# Patient Record
Sex: MALE | Race: OTHER | URBAN - METROPOLITAN AREA
[De-identification: names, ages, dates, MRNs, and addresses within clinical notes are randomized per-mention and may not be internally consistent; named-entity substitution may affect disease eponyms.]

---

## 2021-10-26 ENCOUNTER — EMERGENCY (EMERGENCY)
Facility: HOSPITAL | Age: 41
LOS: 1 days | Discharge: HOME | End: 2021-10-26
Attending: EMERGENCY MEDICINE | Admitting: EMERGENCY MEDICINE
Payer: OTHER MISCELLANEOUS

## 2021-10-26 VITALS
DIASTOLIC BLOOD PRESSURE: 88 MMHG | TEMPERATURE: 98 F | RESPIRATION RATE: 18 BRPM | SYSTOLIC BLOOD PRESSURE: 157 MMHG | OXYGEN SATURATION: 98 % | WEIGHT: 169.98 LBS | HEART RATE: 68 BPM | HEIGHT: 65 IN

## 2021-10-26 VITALS
TEMPERATURE: 99 F | SYSTOLIC BLOOD PRESSURE: 134 MMHG | HEART RATE: 67 BPM | DIASTOLIC BLOOD PRESSURE: 88 MMHG | OXYGEN SATURATION: 98 % | RESPIRATION RATE: 16 BRPM
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
## 2021-10-26 DIAGNOSIS — M79.642 PAIN IN LEFT HAND: ICD-10-CM

## 2021-10-26 DIAGNOSIS — M25.532 PAIN IN LEFT WRIST: ICD-10-CM

## 2021-10-26 DIAGNOSIS — T14.8XXA OTHER INJURY OF UNSPECIFIED BODY REGION, INITIAL ENCOUNTER: ICD-10-CM

## 2021-10-26 DIAGNOSIS — M79.661 PAIN IN RIGHT LOWER LEG: ICD-10-CM

## 2021-10-26 DIAGNOSIS — M79.645 PAIN IN LEFT FINGER(S): ICD-10-CM
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
## 2021-10-26 PROCEDURE — 73552 X-RAY EXAM OF FEMUR 2/>: CPT | Mod: 26,RT

## 2021-10-26 PROCEDURE — 72128 CT CHEST SPINE W/O DYE: CPT | Mod: 26

## 2021-10-26 PROCEDURE — 73130 X-RAY EXAM OF HAND: CPT | Mod: 26,LT

## 2021-10-26 PROCEDURE — 73630 X-RAY EXAM OF FOOT: CPT | Mod: 26,RT

## 2021-10-26 PROCEDURE — 99285 EMERGENCY DEPT VISIT HI MDM: CPT

## 2021-10-26 RX ORDER — METHOCARBAMOL 500 MG/1
2 TABLET, FILM COATED ORAL
Qty: 30 | Refills: 0
Start: 2021-10-26 | End: 2021-10-30

## 2021-10-26 RX ORDER — METHOCARBAMOL 500 MG/1
1500 TABLET, FILM COATED ORAL ONCE
Refills: 0 | Status: COMPLETED | OUTPATIENT
Start: 2021-10-26 | End: 2021-10-26

## 2021-10-26 RX ORDER — KETOROLAC TROMETHAMINE 30 MG/ML
15 SYRINGE (ML) INJECTION ONCE
Refills: 0 | Status: DISCONTINUED | OUTPATIENT
Start: 2021-10-26 | End: 2021-10-26

## 2021-10-26 RX ORDER — IBUPROFEN 200 MG
1.25 TABLET ORAL
Qty: 70 | Refills: 0
Start: 2021-10-26 | End: 2021-11-08

## 2021-10-26 RX ORDER — CYCLOBENZAPRINE HYDROCHLORIDE 10 MG/1
10 TABLET, FILM COATED ORAL ONCE
Refills: 0 | Status: COMPLETED | OUTPATIENT
Start: 2021-10-26 | End: 2021-10-26

## 2021-10-26 RX ORDER — LIDOCAINE 4 G/100G
1 CREAM TOPICAL ONCE
Refills: 0 | Status: COMPLETED | OUTPATIENT
Start: 2021-10-26 | End: 2021-10-26

## 2021-10-26 RX ORDER — ACETAMINOPHEN 500 MG
650 TABLET ORAL ONCE
Refills: 0 | Status: COMPLETED | OUTPATIENT
Start: 2021-10-26 | End: 2021-10-26

## 2021-10-26 RX ADMIN — Medication 15 MILLIGRAM(S): at 10:58

## 2021-10-26 RX ADMIN — Medication 650 MILLIGRAM(S): at 10:57

## 2021-10-26 RX ADMIN — LIDOCAINE 1 PATCH: 4 CREAM TOPICAL at 12:54

## 2021-10-26 RX ADMIN — Medication 15 MILLIGRAM(S): at 11:30

## 2021-10-26 RX ADMIN — Medication 650 MILLIGRAM(S): at 11:50

## 2021-10-26 RX ADMIN — CYCLOBENZAPRINE HYDROCHLORIDE 10 MILLIGRAM(S): 10 TABLET, FILM COATED ORAL at 12:54

## 2021-10-26 RX ADMIN — METHOCARBAMOL 1500 MILLIGRAM(S): 500 TABLET, FILM COATED ORAL at 10:58

## 2021-10-26 NOTE — ED ADULT TRIAGE NOTE - CHIEF COMPLAINT QUOTE
PT BIBEMS after falling off ladder (10-12ft) complaining of R thigh and left wrist pain. . PT denies hitting head, no neck pain, no back pain. PT arrives in C-collar. Pt was unable to walk on scene.

## 2021-10-26 NOTE — ED PROVIDER NOTE - CONSTITUTIONAL, MLM
normal... C-collar in place. Awake, alert, oriented to person, place, time/situation and in no apparent distress.

## 2021-10-26 NOTE — ED PROVIDER NOTE - NSFOLLOWUPINSTRUCTIONS_ED_ALL_ED_FT
MUSCLE STRAIN - AfterCare(R) Instructions(ER/ED)           Muscle Strain    WHAT YOU NEED TO KNOW:    A muscle strain is a twist, pull, or tear of a muscle or tendon. A tendon is a strong elastic tissue that connects a muscle to a bone. Signs of a strained muscle include bruising and swelling over the area, pain with movement, and loss of strength.    DISCHARGE INSTRUCTIONS:    Return to the emergency department if:   •You suddenly cannot feel or move your injured muscle.          Contact your healthcare provider if:   •Your pain and swelling worsen or do not go away.       •You have questions or concerns about your condition or care.      Medicines:   •NSAIDs help decrease swelling and pain or fever. This medicine is available with or without a doctor's order. NSAIDs can cause stomach bleeding or kidney problems in certain people. If you take blood thinner medicine, always ask your healthcare provider if NSAIDs are safe for you. Always read the medicine label and follow directions.      •Muscle relaxers help decrease pain and muscle spasms.      •Take your medicine as directed. Contact your healthcare provider if you think your medicine is not helping or if you have side effects. Tell him of her if you are allergic to any medicine. Keep a list of the medicines, vitamins, and herbs you take. Include the amounts, and when and why you take them. Bring the list or the pill bottles to follow-up visits. Carry your medicine list with you in case of an emergency.      Follow up with your healthcare provider as directed: Your healthcare provider may suggest that you have a follow-up visit before you go back to your usual activity. Write down your questions so you remember to ask them during your visits.    Self-care:   •3 to 7 days after the injury: Use Rest, Ice, Compression, and Elevation (RICE) to help stop bruising and decrease pain and swelling.?Rest: Rest your muscle to allow your injury to heal. When the pain decreases, begin normal, slow movements. For mild and moderate muscle strains, you should rest your muscles for about 2 days. However, if you have a severe muscle strain, you should rest for 10 to 14 days. You may need to use crutches to walk if your muscle strain is in your legs or lower body.       ?Ice: Put an ice pack on the injured area. Put a towel between the ice pack and your skin. Do not put the ice pack directly on your skin. You can use a package of frozen peas instead of an ice pack.      ?Compression: You may need to wrap an elastic bandage around the area to decrease swelling. It should be tight enough for you to feel support. Do not wrap it too tightly.       ?Elevation: Keep the injured muscle raised above your heart if possible. For example if you have a strain of your lower leg muscle, lie down and prop your leg up on pillows. This helps decrease pain and swelling.      •3 to 21 days after the injury: Start to slowly and regularly exercise your muscle. This will help it heal. If you feel pain, decrease how hard you are exercising.       •1 to 6 weeks after the injury: Stretch the injured muscle. Hold the stretch for about 30 seconds. Do this 4 times a day. You may stretch the muscle until you feel a slight pull. Stop stretching if you feel pain.       •2 weeks to 6 months after the injury: The goal of this phase is to return to the activity you were doing before the injury happened, without hurting the muscle again.       •3 weeks to 6 months after the injury: Keep stretching and strengthening your muscles to avoid injury. Slowly increase the time and distance that you exercise. You may have signs and symptoms of muscle strain 6 months after the injury, even if you do things to help it heal. In this case, you may need surgery on the muscle.          © Copyright [employer] 2021           back to top                          © Copyright [employer] 2021

## 2021-10-26 NOTE — ED PROVIDER NOTE - ENMT, MLM
Airway patent. Airway patent. no cervical spine tenderness, no step offs, no deformities. Head NCAT. FROM of neck/cervical spine without issue

## 2021-10-26 NOTE — ED PROVIDER NOTE - CLINICAL SUMMARY MEDICAL DECISION MAKING FREE TEXT BOX
Exam is remarkable for mild pain to the dorsum of the R foot at the forefoot and underneath the foot. There is tenderness and active spasms to the R thigh and some Tenderness to palpation over the L hand. Pt has point versus paraspinal tenderness to the upper thoracic spine. Plan for pain control, XR imaging [L hand. R foot], and CT imaging of the T-spine. Will reassess clinically after results have been obtained. 42 y/o M presenting with L hand/wrist pain and RLE pain. Exam is remarkable for mild pain to the dorsum of the R foot at the forefoot and underneath the foot. There is tenderness and active spasms to the R thigh and some Tenderness to palpation over the L hand. Pt has point versus paraspinal tenderness to the upper thoracic spine. Plan for pain control, XR imaging [L hand. R foot], and CT imaging of the T-spine. Will reassess clinically after results have been obtained.

## 2021-10-26 NOTE — ED PROVIDER NOTE - PATIENT PORTAL LINK FT
You can access the FollowMyHealth Patient Portal offered by [facility] by registering at the following website: [email or web address]. By joining [employer]’s FollowMyHealth portal, you will also be able to view your health information using other applications (apps) compatible with our system.

## 2021-10-26 NOTE — ED PROVIDER NOTE - PROGRESS NOTE DETAILS
XR imaging is [-] for Fx or dislocation. CT imaging is unremarkable. Will treat for musculoskeletal pain. Pt was able to walk around the ED without assistance but notes some R leg pain. Pt was able to ambulate around the ED but with R anterior thigh pain. Will give Flexeril and Lidocaine patch. Pt was able to ambulate around the ED but with R anterior thigh pain. Will give Flexeril and Lidocaine patch. xray R femur negative Patient dressed himself, asking to leave, came up to work station, states he feels well. Discharge to home.  Patient to f/u with PCP. MSK strain

## 2021-10-26 NOTE — ED PROVIDER NOTE - MUSCULOSKELETAL, MLM
Mild pain to dorsum of R foot at the forefoot and underneath the foot. Tenderness and active spasms to the R thigh. Some Tenderness to palpation over the L hand. Point versus paraspinal tenderness to the upper thoracic spine. No wrist or ankle tenderness. No tenderness over the tib/fib region. Mild pain to dorsum of R foot at the forefoot and underneath the foot. Tenderness and active spasms to the R thigh. no deformities Some Tenderness to palpation over the L  thumb tip, no deformity. Point versus paraspinal tenderness to the upper thoracic spine. No wrist or ankle tenderness. No tenderness over the tib/fib region.

## 2021-10-26 NOTE — ED ADULT NURSE NOTE - NSIMPLEMENTINTERV_GEN_ALL_ED
Implemented All Fall Risk Interventions:  [place] to call system. Call bell, personal items and telephone within reach. Instruct patient to call for assistance. Room bathroom lighting operational. Non-slip footwear when patient is off stretcher. Physically safe environment: no spills, clutter or unnecessary equipment. Stretcher in lowest position, wheels locked, appropriate side rails in place. Provide visual cue, wrist band, yellow gown, etc. Monitor gait and stability. Monitor for mental status changes and reorient to person, place, and time. Review medications for side effects contributing to fall risk. Reinforce activity limits and safety measures with patient and family.

## 2021-10-26 NOTE — ED PROVIDER NOTE - OBJECTIVE STATEMENT
40 y/o M with no PMHx presents to the ED for evaluation s/p fall from ladder. Pt reports he was working earlier today with a helmet on when he fell off the ladder [about 10-12 ft above ground]. He did not have head strike or LOC at the time. However, he began having RLE pain and L wrist pain. EMS was called who brought Pt to the ED for evaluation. Pt was placed in a c-collar. During interview, Pt states he is able to walk. He denies neck pain, numbness, tingling, weakness, or any additional injuries. 42 y/o M with no PMHx presents to the ED for evaluation s/p fall from ladder. Pt reports he was working earlier today with a helmet on when he fell off the ladder [about 10-12 ft above ground]. He did not have head strike or LOC at the time. However, he began having RLE pain, in the anterior thigh and foot diffusely and L tip of thumb pain. EMS was called who brought Pt to the ED for evaluation. Pt was placed in a c-collar. During interview, Pt states he is able to walk. He denies neck pain, numbness, tingling, weakness, or any additional injuries.